# Patient Record
Sex: FEMALE | Race: OTHER | NOT HISPANIC OR LATINO | URBAN - METROPOLITAN AREA
[De-identification: names, ages, dates, MRNs, and addresses within clinical notes are randomized per-mention and may not be internally consistent; named-entity substitution may affect disease eponyms.]

---

## 2017-10-18 ENCOUNTER — EMERGENCY (EMERGENCY)
Facility: HOSPITAL | Age: 22
LOS: 1 days | Discharge: HOME | End: 2017-10-18
Admitting: EMERGENCY MEDICINE
Payer: COMMERCIAL

## 2017-10-18 VITALS
DIASTOLIC BLOOD PRESSURE: 97 MMHG | HEIGHT: 68 IN | HEART RATE: 98 BPM | SYSTOLIC BLOOD PRESSURE: 147 MMHG | OXYGEN SATURATION: 100 % | TEMPERATURE: 98 F | RESPIRATION RATE: 17 BRPM | WEIGHT: 179.9 LBS

## 2017-10-18 LAB
AMYLASE P1 CFR SERPL: 41 U/L — SIGNIFICANT CHANGE UP (ref 25–115)
ANION GAP SERPL CALC-SCNC: 11 MMOL/L — SIGNIFICANT CHANGE UP (ref 9–16)
BUN SERPL-MCNC: 9 MG/DL — SIGNIFICANT CHANGE UP (ref 7–23)
CALCIUM SERPL-MCNC: 9.1 MG/DL — SIGNIFICANT CHANGE UP (ref 8.5–10.5)
CHLORIDE SERPL-SCNC: 103 MMOL/L — SIGNIFICANT CHANGE UP (ref 96–108)
CO2 SERPL-SCNC: 25 MMOL/L — SIGNIFICANT CHANGE UP (ref 22–31)
CREAT SERPL-MCNC: 0.9 MG/DL — SIGNIFICANT CHANGE UP (ref 0.5–1.3)
GLUCOSE SERPL-MCNC: 90 MG/DL — SIGNIFICANT CHANGE UP (ref 70–99)
HCG UR QL: NEGATIVE — SIGNIFICANT CHANGE UP
HCT VFR BLD CALC: 40.8 % — SIGNIFICANT CHANGE UP (ref 34.5–45)
HGB BLD-MCNC: 14.1 G/DL — SIGNIFICANT CHANGE UP (ref 11.5–15.5)
LACTATE SERPL-SCNC: 0.7 MMOL/L — SIGNIFICANT CHANGE UP (ref 0.4–2)
MCHC RBC-ENTMCNC: 30.7 PG — SIGNIFICANT CHANGE UP (ref 27–34)
MCHC RBC-ENTMCNC: 34.6 G/DL — SIGNIFICANT CHANGE UP (ref 32–36)
MCV RBC AUTO: 88.9 FL — SIGNIFICANT CHANGE UP (ref 80–100)
PLATELET # BLD AUTO: 290 K/UL — SIGNIFICANT CHANGE UP (ref 150–400)
POTASSIUM SERPL-MCNC: 3.3 MMOL/L — LOW (ref 3.5–5.3)
POTASSIUM SERPL-SCNC: 3.3 MMOL/L — LOW (ref 3.5–5.3)
RBC # BLD: 4.59 M/UL — SIGNIFICANT CHANGE UP (ref 3.8–5.2)
RBC # FLD: 13.2 % — SIGNIFICANT CHANGE UP (ref 10.3–16.9)
SODIUM SERPL-SCNC: 139 MMOL/L — SIGNIFICANT CHANGE UP (ref 132–145)
WBC # BLD: 8.1 K/UL — SIGNIFICANT CHANGE UP (ref 3.8–10.5)
WBC # FLD AUTO: 8.1 K/UL — SIGNIFICANT CHANGE UP (ref 3.8–10.5)

## 2017-10-18 PROCEDURE — 70487 CT MAXILLOFACIAL W/DYE: CPT | Mod: 26

## 2017-10-18 PROCEDURE — 99285 EMERGENCY DEPT VISIT HI MDM: CPT | Mod: 25

## 2017-10-18 RX ORDER — GABAPENTIN 400 MG/1
300 CAPSULE ORAL ONCE
Qty: 0 | Refills: 0 | Status: COMPLETED | OUTPATIENT
Start: 2017-10-18 | End: 2017-10-18

## 2017-10-18 RX ORDER — DIAZEPAM 5 MG
5 TABLET ORAL ONCE
Qty: 0 | Refills: 0 | Status: DISCONTINUED | OUTPATIENT
Start: 2017-10-18 | End: 2017-10-18

## 2017-10-18 RX ORDER — MORPHINE SULFATE 50 MG/1
4 CAPSULE, EXTENDED RELEASE ORAL ONCE
Qty: 0 | Refills: 0 | Status: DISCONTINUED | OUTPATIENT
Start: 2017-10-18 | End: 2017-10-18

## 2017-10-18 RX ORDER — IBUPROFEN 200 MG
1 TABLET ORAL
Qty: 20 | Refills: 0 | OUTPATIENT
Start: 2017-10-18 | End: 2017-11-17

## 2017-10-18 RX ORDER — KETOROLAC TROMETHAMINE 30 MG/ML
30 SYRINGE (ML) INJECTION ONCE
Qty: 0 | Refills: 0 | Status: DISCONTINUED | OUTPATIENT
Start: 2017-10-18 | End: 2017-10-18

## 2017-10-18 RX ORDER — DIAZEPAM 5 MG
1 TABLET ORAL
Qty: 12 | Refills: 0 | OUTPATIENT
Start: 2017-10-18

## 2017-10-18 RX ORDER — DEXAMETHASONE 0.5 MG/5ML
10 ELIXIR ORAL ONCE
Qty: 0 | Refills: 0 | Status: COMPLETED | OUTPATIENT
Start: 2017-10-18 | End: 2017-10-18

## 2017-10-18 RX ADMIN — GABAPENTIN 300 MILLIGRAM(S): 400 CAPSULE ORAL at 07:23

## 2017-10-18 RX ADMIN — Medication 10 MILLIGRAM(S): at 06:40

## 2017-10-18 RX ADMIN — MORPHINE SULFATE 4 MILLIGRAM(S): 50 CAPSULE, EXTENDED RELEASE ORAL at 06:40

## 2017-10-18 RX ADMIN — Medication 5 MILLIGRAM(S): at 05:38

## 2017-10-18 RX ADMIN — Medication 30 MILLIGRAM(S): at 06:40

## 2017-10-18 RX ADMIN — MORPHINE SULFATE 4 MILLIGRAM(S): 50 CAPSULE, EXTENDED RELEASE ORAL at 05:38

## 2017-10-18 NOTE — ED PROVIDER NOTE - PHYSICAL EXAMINATION
Gen - WDWN, NAD, comfortable in stretcher,  non-toxic appearing  Skin - warm, dry, intact   HEENT - AT/NC, PERRL, EOMI,   CV - S1S2, R/R/R  Resp - CTAB, no r/r/w   MS - w/w/p, no c/c/e, no CVAT b/l  Neuro - AxOx3, ambulatory without gait disturbance Gen - WDWN F, in pain, no respiratory distress   Skin - warm, dry, intact, no acute rash noted   HEENT - AT/NC, PERRL, EOMI, good dentition, no gingival edema or tenderness, L lower jaw and TMJ TTP with slight edema, no palpable click, deformity or laxity, unable to fully open mouth, uvula midline, airway patent, no palpable nodes b/l   CV - S1S2, R/R/R  Resp - CTAB, no r/r/w   MS - w/w/p, no c/c/e, no CVAT b/l  Neuro - AxOx3, no focal neuro deficits, ambulatory without gait disturbance

## 2017-10-18 NOTE — ED ADULT TRIAGE NOTE - CHIEF COMPLAINT QUOTE
Pt presents to ED with c/o jaw pain since last night, patient took motrin with minimal relief. Patient tolerating secretions, speaking in full sentences, denies trauma, no airway involvement. O2 Saturation 100% on RA. Pt presents to ED with c/o left sided jaw pain since last night, patient took motrin with minimal relief. Patient tolerating secretions, speaking in full sentences, denies trauma, no airway involvement. O2 Saturation 100% on RA.

## 2017-10-18 NOTE — ED PROVIDER NOTE - MEDICAL DECISION MAKING DETAILS
AFVSS at time of d/c, pt non-toxic appearing and hemodynamically stable, results, ddx, and f/u plans discussed with pt at bedside, d/c'd home to f/u with PMD, strict return precautions discussed, prompt return to ER for any worsening or new sx, pt verbalized understanding. pt p/w atraumatic L jaw pain radiating to the neck and head with spasm, no focal neuro deficits on exam, labs unremarkable including normal amylase and lactic, CT with no TMJ dislocation or infectious process, pain adequately controlled in the ED, sx may be 2/2 muscle spasm vs trigeminal neuralgia vs TMJ, encouraged warm compression, course of pain meds w muscle relaxant, soft diet, prompt f/u with OMFS and neuro, strict return precautions discussed, prompt return to ER for any worsening or new sx, pt verbalized understanding.

## 2017-10-18 NOTE — ED PROVIDER NOTE - OBJECTIVE STATEMENT
21 yo F with no known PMHx, presenting c/o L sided jaw x 1d.  Denies trauma, tinnitus, change in hearing/gait/balance, dysequilibrium, HA, dizziness, fever, chills, FB sensation, toothache, cough, sore throat, rash, pruritus, N/V, SOB, CP, palpitations, focal weakness, and malaise. 23 yo F with PMHx of ADHD, presenting c/o L sided jaw x 1d.  Pt reports doing some singing practice in school today and noted gradual onset of pain in the L lower jaw region.  Pain is sharp, throbbing, and radiates from lower jaw to the TMJ, neck and frontal L scalp region.  Has been taking 1g of tylenol ATC with no improvement.  Now with worsening pain, clicking sensation, unable to open mouth, and L sided HA.  Denies trauma, tinnitus, change in hearing/gait/balance, dysequilibrium, dizziness, fever, chills, FB sensation, toothache, cough, sore throat, drooling, change in phonation, rash, pruritus, N/V, SOB, CP, palpitations, numbness, tingling, focal weakness, and malaise. Pt is fully vaccinated.  No recent travel or sick contact noted

## 2017-10-22 DIAGNOSIS — R68.84 JAW PAIN: ICD-10-CM

## 2017-10-22 DIAGNOSIS — F17.200 NICOTINE DEPENDENCE, UNSPECIFIED, UNCOMPLICATED: ICD-10-CM

## 2017-10-22 DIAGNOSIS — M62.838 OTHER MUSCLE SPASM: ICD-10-CM

## 2017-10-22 DIAGNOSIS — R51 HEADACHE: ICD-10-CM

## 2023-06-19 NOTE — ED ADULT NURSE NOTE - CADM POA CENTRAL LINE
[FreeTextEntry1] : I, Dr. Enrique Gloria, personally performed the evaluation and management (E/M) services for this established patient who presents today with (a) new problem(s)/exacerbation of (an) existing condition(s).  That E/M includes conducting the examination, assessing all new/exacerbated conditions, and establishing a new plan of care.  Today, my ACP, Ora Thomas NP, was here to observe my evaluation and management services for this new problem/exacerbated condition to be followed going forward. \par 
No

## 2023-09-22 NOTE — ED ADULT NURSE NOTE - CHIEF COMPLAINT QUOTE
Biometrics completed.    Results reviewed with a Registered Nurse; understanding of results and   educational materials was verbalized.   Pt presents to ED with c/o left sided jaw pain since last night, patient took motrin with minimal relief. Patient tolerating secretions, speaking in full sentences, denies trauma, no airway involvement. O2 Saturation 100% on RA.

## 2025-03-16 NOTE — ED ADULT TRIAGE NOTE - HEART RATE (BEATS/MIN)
Pt in no acute distress. Ambulates with a steady gait. Verbalizes understanding of discharge instructions       Lilly Bullard RN  03/16/25 [id number]     [id number]